# Patient Record
Sex: FEMALE | URBAN - METROPOLITAN AREA
[De-identification: names, ages, dates, MRNs, and addresses within clinical notes are randomized per-mention and may not be internally consistent; named-entity substitution may affect disease eponyms.]

---

## 2018-02-07 ENCOUNTER — OFFICE VISIT (OUTPATIENT)
Dept: FAMILY MEDICINE CLINIC | Age: 33
End: 2018-02-07

## 2018-02-07 VITALS
SYSTOLIC BLOOD PRESSURE: 93 MMHG | HEIGHT: 61 IN | DIASTOLIC BLOOD PRESSURE: 60 MMHG | TEMPERATURE: 97.6 F | RESPIRATION RATE: 18 BRPM | HEART RATE: 69 BPM | BODY MASS INDEX: 24.96 KG/M2 | OXYGEN SATURATION: 99 % | WEIGHT: 132.2 LBS

## 2018-02-07 DIAGNOSIS — N39.0 RECURRENT UTI: ICD-10-CM

## 2018-02-07 DIAGNOSIS — Z13.21 SCREENING FOR ENDOCRINE, NUTRITIONAL, METABOLIC AND IMMUNITY DISORDER: ICD-10-CM

## 2018-02-07 DIAGNOSIS — Z00.00 PHYSICAL EXAM: Primary | ICD-10-CM

## 2018-02-07 DIAGNOSIS — N91.2 AMENORRHEA: ICD-10-CM

## 2018-02-07 DIAGNOSIS — Z13.0 SCREENING FOR ENDOCRINE, NUTRITIONAL, METABOLIC AND IMMUNITY DISORDER: ICD-10-CM

## 2018-02-07 DIAGNOSIS — Z12.4 PAP SMEAR FOR CERVICAL CANCER SCREENING: ICD-10-CM

## 2018-02-07 DIAGNOSIS — Z13.29 SCREENING FOR ENDOCRINE, NUTRITIONAL, METABOLIC AND IMMUNITY DISORDER: ICD-10-CM

## 2018-02-07 DIAGNOSIS — Z13.228 SCREENING FOR ENDOCRINE, NUTRITIONAL, METABOLIC AND IMMUNITY DISORDER: ICD-10-CM

## 2018-02-07 PROBLEM — Z98.51 S/P TUBAL LIGATION: Status: ACTIVE | Noted: 2018-02-07

## 2018-02-07 PROBLEM — Z98.891 H/O CESAREAN SECTION: Status: ACTIVE | Noted: 2018-02-07

## 2018-02-07 LAB
BILIRUB UR QL STRIP: NEGATIVE
GLUCOSE UR-MCNC: NEGATIVE MG/DL
HCG URINE, QL. (POC): NEGATIVE
KETONES P FAST UR STRIP-MCNC: NEGATIVE MG/DL
PH UR STRIP: 5.5 [PH] (ref 4.6–8)
PROT UR QL STRIP: NEGATIVE
SP GR UR STRIP: 1.01 (ref 1–1.03)
UA UROBILINOGEN AMB POC: NORMAL (ref 0.2–1)
URINALYSIS CLARITY POC: CLEAR
URINALYSIS COLOR POC: YELLOW
URINE BLOOD POC: NORMAL
URINE LEUKOCYTES POC: NEGATIVE
URINE NITRITES POC: NEGATIVE
VALID INTERNAL CONTROL?: YES

## 2018-02-07 NOTE — PROGRESS NOTES
Chief Complaint   Patient presents with    New Patient     routine check up establish care       Pt preferred language for health care discussion is English. Is someone accompanying this pt?     Is the patient using any DME equipment during [address]? no    Depression Screening:  PHQ over the last two weeks 2/7/2018   Little interest or pleasure in doing things Not at all   Feeling down, depressed or hopeless Not at all   Total Score PHQ 2 0       Learning Assessment:  Learning Assessment 2/7/2018   PRIMARY LEARNER Patient   LEARNER PREFERENCE PRIMARY LISTENING   ANSWERED BY COURTNEY Daniel   RELATIONSHIP SELF       Abuse Screening:  Abuse Screening Questionnaire 2/7/2018   Do you ever feel afraid of your partner? N   Are you in a relationship with someone who physically or mentally threatens you? N   Is it safe for you to go home? Y         Health Maintenance reviewed and discussed per provider. Yes, pt first visit any and all HM discussed with and ordered per the Provider    Pt currently taking Antiplatelet therapy? no    Coordination of Care:  1. Have you been to the ER, urgent care clinic since your last visit? Hospitalized since your last visit? no    2. Have you seen or consulted any other health care providers outside of the [address] [place] [address] since your last visit? Include any pap smears or colon screening.  no

## 2018-02-07 NOTE — PROGRESS NOTES
HISTORY OF PRESENT ILLNESS  Aracelis Salgado is a 28 y.o. female. HPI Comments: Patient is here to establish care. Patient does see an eye doctor and dentist.   She mentions she has not had a period for 2 months and her last one was . She mentions she had her last child 2 years back and had a tubal ligation with the  section. Prior to this she was having some left sided abdominal pain and recurrent UTI's which have not occurred since. She does mentions every once and a while while she is doing some house work she may feel a sharp stinging sensation in the left lower abdominal region. She has not had a Pap smear in a few years and is due for some blood work. She would like to have all this done today. I have advised her that first I would like to order a pregnancy test and urine dipstick and she verbalizes an understanding. New Patient   The history is provided by the patient. This is a new problem. The problem occurs constantly. The problem has not changed since onset. Associated symptoms include abdominal pain. Pertinent negatives include no chest pain, no headaches and no shortness of breath. Nothing aggravates the symptoms. Nothing relieves the symptoms. She has tried nothing for the symptoms. Physical   The history is provided by the patient. This is a new problem. The problem occurs constantly. The problem has not changed since onset. Associated symptoms include abdominal pain. Pertinent negatives include no chest pain, no headaches and no shortness of breath. Nothing aggravates the symptoms. Nothing relieves the symptoms. She has tried nothing for the symptoms. Review of Systems   Constitutional: Negative for chills, fever and malaise/fatigue. HENT: Negative for congestion, ear discharge, ear pain, hearing loss, sinus pain and sore throat. Eyes: Negative for blurred vision, double vision, pain and discharge.    Respiratory: Negative for cough, sputum production, shortness of breath and wheezing. Cardiovascular: Negative for chest pain, palpitations, claudication and leg swelling. Gastrointestinal: Positive for abdominal pain. Negative for constipation, diarrhea, nausea and vomiting. Genitourinary: Positive for flank pain. Negative for dysuria and urgency. Left lower flank region    Musculoskeletal: Negative for joint pain and myalgias. Neurological: Negative for dizziness, focal weakness, weakness and headaches. Psychiatric/Behavioral: The patient is not nervous/anxious. Visit Vitals    BP 93/60    Pulse 69    Temp 97.6 °F (36.4 °C) (Oral)    Resp 18    Ht 5' 1\" (1.549 m)    Wt 132 lb 3.2 oz (60 kg)    SpO2 99%    BMI 24.98 kg/m2       Physical Exam   Constitutional: She is oriented to person, place, and time. She appears well-developed and well-nourished. No distress. HENT:   Head: Normocephalic and atraumatic. Right Ear: External ear normal.   Left Ear: External ear normal.   Mouth/Throat: Oropharynx is clear and moist. No oropharyngeal exudate. Eyes: EOM are normal. Pupils are equal, round, and reactive to light. No scleral icterus. Neck: Normal range of motion. No thyromegaly present. Cardiovascular: Normal rate, regular rhythm and normal heart sounds. Pulmonary/Chest: Effort normal and breath sounds normal. No respiratory distress. She has no wheezes. Abdominal: Soft. Bowel sounds are normal. She exhibits no distension. There is tenderness. There is no rebound. Lymphadenopathy:     She has no cervical adenopathy. Neurological: She is alert and oriented to person, place, and time. Psychiatric: She has a normal mood and affect. ASSESSMENT and PLAN  Physical exam :  1) Please make sure you have a routine physical exam every 1-2 years. 2) Annual check up with eye doctor and dentist.  3) Annual mammograms for all females starting at age of 36.  3) Self breast exam every month starting at age of 21 and above.   5) Clinical breast exam to be done every 3 years for woman between 20-30 and every year for all woman 36 and above. 6) Pap smear every 3 years starting at age 24( between 24- 27 it may be more often). At the age of 27 to 72  can switch to every 5 years with HPV screening. Woman over the age of 72 with regular cervical cancer testing with normal results no longer need testing. 7) Colorectal cancer screening with colonoscopy every ten years. 8) Bone density testing starting at the age of 72.   5) Routine blood work to be ordered as part of physical exam and has been discussed with patient. 10) Screening for STD's/HIV. 11) Exercise at least 30 min 3-5 times a week for goal BMI of less than or equal to 25.  12) Please make sure you wear a seat belt while driving daily , helmet safety discussed. 13) Please avoid smoking , alcohol and illicit drug use.   14) Daily requirement of calcium is 1200 mg per day and 1000 IU of vitamin D.  15) Please make sure all immunizations are up to date:       - Influenza vaccine every year        - Tdap every 10 years       - Pneumococcal vaccine starting at age of 72       - Shingles at age 61   Point of care testing has been discussed with patient     Left lower flank pain with amenorrhea for two months:  - Point of care urine dipstick and pregnancy testing is negative

## 2018-02-07 NOTE — PROGRESS NOTES
Verified name and  with parent. Order verified in [facility] per [facility]. Verified 6 rights. Informed patient of procedure, and rational, denies any questions. Successful cannulation of patient's vein (L antacubital) via # 23 Gauge  butterfly using aseptic technique no swelling noted. 2x2 and band-aid applied. Specimen sent to lab for further testing Patient tolerated the procedure without incident.  Released from clinic

## 2018-02-22 ENCOUNTER — TELEPHONE (OUTPATIENT)
Dept: FAMILY MEDICINE CLINIC | Age: 33
End: 2018-02-22

## 2018-02-22 NOTE — TELEPHONE ENCOUNTER
Pt's  stated was to [facility] call with lab results but has not yet. Please call ASAP.

## 2018-02-26 ENCOUNTER — TELEPHONE (OUTPATIENT)
Dept: FAMILY MEDICINE CLINIC | Age: 33
End: 2018-02-26

## 2018-02-26 NOTE — TELEPHONE ENCOUNTER
I have attempted to call patient and a few times someone picks up and says helmerced and when I speak they hang up. I have left a voice mail. Letter will be generated and results will be mailed.

## 2018-02-26 NOTE — TELEPHONE ENCOUNTER
Thom Miner I have this patients nu swab results but not the actual pap , can you get me these results so that I can call her today

## 2018-02-26 NOTE — TELEPHONE ENCOUNTER
Pt's  requesting lab results. I read him letter by Dr Wilfred Bustamante.  states please call pt again to explain to wife. [phone]. States wife will answer the phone.  States either nurse or dr Wilfred Bustamante

## 2018-02-27 NOTE — TELEPHONE ENCOUNTER
Rimma Dash 20 hours ago (3:24 PM)                 Pt's  requesting lab results. I read him letter by Dr Es Gannon.  states please call pt again to explain to wife. [phone]. States wife will answer the phone.  States either nurse or dr Davion Galan

## 2018-03-22 DIAGNOSIS — Z13.228 SCREENING FOR ENDOCRINE, NUTRITIONAL, METABOLIC AND IMMUNITY DISORDER: ICD-10-CM

## 2018-03-22 DIAGNOSIS — Z12.4 PAP SMEAR FOR CERVICAL CANCER SCREENING: ICD-10-CM

## 2018-03-22 DIAGNOSIS — Z13.29 SCREENING FOR ENDOCRINE, NUTRITIONAL, METABOLIC AND IMMUNITY DISORDER: ICD-10-CM

## 2018-03-22 DIAGNOSIS — Z13.0 SCREENING FOR ENDOCRINE, NUTRITIONAL, METABOLIC AND IMMUNITY DISORDER: ICD-10-CM

## 2018-03-22 DIAGNOSIS — Z13.21 SCREENING FOR ENDOCRINE, NUTRITIONAL, METABOLIC AND IMMUNITY DISORDER: ICD-10-CM
